# Patient Record
(demographics unavailable — no encounter records)

---

## 2024-10-16 NOTE — ASSESSMENT
[FreeTextEntry1] : Flu/COVID vax given. Due for c-scopy., Sertraline refilled. L ear sympotms like due to ETD and will start nasal rinses.  NSAIDs/PT for shoulder pain.

## 2024-10-16 NOTE — REVIEW OF SYSTEMS
[Negative] : Heme/Lymph [Earache] : earache [Hearing Loss] : no hearing loss [Nosebleed] : no nosebleeds [Nasal Discharge] : no nasal discharge [Sore Throat] : no sore throat [Postnasal Drip] : no postnasal drip [Joint Pain] : joint pain [Joint Stiffness] : joint stiffness [Joint Swelling] : no joint swelling

## 2024-10-16 NOTE — HISTORY OF PRESENT ILLNESS
[de-identified] : Here for f/u. Recall MAMMO ok 9/24. Remains Due for c-scopy., On higher dose of Sertraline since winter. Prefers this dose. Lots of family stress. Saw cardiologist. Doing well on Wegovy. Needs flu and COVID vaccines. L ear pressure with bending neck. No change in hearing.   R shoulder pain with external rotation. Tender along biceps insertion. No trauma. +clicking

## 2024-10-16 NOTE — PHYSICAL EXAM
[Normal Oropharynx] : the oropharynx was normal [Normal TMs] : both tympanic membranes were normal [Normal Nasal Mucosa] : the nasal mucosa was normal [No Edema] : there was no peripheral edema [Normal] : affect was normal and insight and judgment were intact

## 2025-01-15 NOTE — HISTORY OF PRESENT ILLNESS
[FreeTextEntry1] : Ms. HALL is a 59 year old female with pmhx of obesity who presents for follow-up.  Patient of Dr Mcgregor, last (initial) visit, 7/3/23 Last visit with myself, 07/11/2024  Interval change: Since last visit, has continued wegovy, titrating to current dosing of 1.7mg/weekly with tolerance.  Starting weight 194lb with current weight today of 156 for total of 38lb weight loss since wegovy initiation = TBW loss of 19.5% Weighs self on Wednesday and injects on this day, some weight gain over holidays, starting to come down, has plateaued.   Weight goal of 150   1. Obesity The patient's current BMI is: 24.43 from  26.78 from 29.76 from 30.38 (7/2023) The patient has been overweight or obese since childhood Complications related to obesity: HLD, osteoarthritis   The patient has tried the following in attempts to lose weight:  -- comprehensive weight loss programs, such as WW, Weight loss center at [facility] -- meal delivery program (similar to nutrisystem) -- diet modification (Atkins diet)  Medical management history includes:  -- Metformin (off-label) in the past -- wegovy 1.7mg/weekly (CURRENT) with Starting weight 194lb with current weight today of 156 for total of 38lb weight loss since wegovy initiation = TBW loss of 19.5%  No past surgical management.  Current diet:  -- 3 meals daily, +snack mid-morning, less of snack on wegovy  Current exercise:  -- walking, some decreased r/t bulge disc in back, manifesting as hip pain.  Improving pain and increasing walking again.

## 2025-01-15 NOTE — ASSESSMENT
[FreeTextEntry1] : 1. Obesity Obesity, class I. BMI trend since initiating Wegovy: BMI is: 26.78 from 29.76 from 30.38 (7/2023) Weight related comorbidities of HLD and osteoarthritis Starting weight 194lb with current weight today of 156 for total of 38lb weight loss since wegovy initiation = TBW loss of 19.5%  Kaila continues to endorse tolerance to wegovy regimen and is pleased with weight loss response it has yielded. She has weight loss plateau and is near her ideal weight and desires trial of dose titration to 2.4mg/weekly -- continue lifestyle modificaitons -- increase wegovy to 2.4mg/weekly  2. HLD On atorvastatin 10mg Most recent lipid panel  Follow-up in 4-6 months with myself or Dr. Balbir Harrell, MS, [facility], [facility] 01/15/2025

## 2025-01-15 NOTE — HISTORY OF PRESENT ILLNESS
[FreeTextEntry1] : Ms. HALL is a 59 year old female with pmhx of obesity who presents for follow-up.  Patient of Dr Mcgregor, last (initial) visit, 7/3/23 Last visit with myself, 07/11/2024  Interval change: Since last visit, has continued wegovy, titrating to current dosing of 1.7mg/weekly with tolerance.  Starting weight 194lb with current weight today of 156 for total of 38lb weight loss since wegovy initiation = TBW loss of 19.5% Weighs self on Wednesday and injects on this day, some weight gain over holidays, starting to come down, has plateaued.   Weight goal of 150   1. Obesity The patient's current BMI is: 24.43 from  26.78 from 29.76 from 30.38 (7/2023) The patient has been overweight or obese since childhood Complications related to obesity: HLD, osteoarthritis   The patient has tried the following in attempts to lose weight:  -- comprehensive weight loss programs, such as WW, Weight loss center at [facility] -- meal delivery program (similar to nutrisystem) -- diet modification (Atkins diet)  Medical management history includes:  -- Metformin (off-label) in the past -- wegovy 1.7mg/weekly (CURRENT) with Starting weight 194lb with current weight today of 156 for total of 38lb weight loss since wegovy initiation = TBW loss of 19.5%  No past surgical management.  Current diet:  -- 3 meals daily, +snack mid-morning, less of snack on wegovy  Current exercise:  -- walking, some decreased r/t bulge disc in back, manifesting as hip pain.  Improving pain and increasing walking again.

## 2025-01-15 NOTE — ASSESSMENT
[FreeTextEntry1] : 1. Obesity Obesity, class I. BMI trend since initiating Wegovy: BMI is: 26.78 from 29.76 from 30.38 (7/2023) Weight related comorbidities of HLD and osteoarthritis Starting weight 194lb with current weight today of 156 for total of 38lb weight loss since wegovy initiation = TBW loss of 19.5%  Kaila continues to endorse tolerance to wegovy regimen and is pleased with weight loss response it has yielded. She has weight loss plateau and is near her ideal weight and desires trial of dose titration to 2.4mg/weekly -- continue lifestyle modificaitons -- increase wegovy to 2.4mg/weekly  2. HLD On atorvastatin 10mg Most recent lipid panel  Follow-up in 4-6 months with myself or Dr. Balbir Harrell, MS, [facility], [facility] 01/15/2025

## 2025-01-15 NOTE — REVIEW OF SYSTEMS
[As Noted in HPI] : as noted in HPI [Recent Weight Loss (___ Lbs)] : recent weight loss: [unfilled] lbs [Joint Pain] : joint pain [Negative] : Respiratory [Fatigue] : no fatigue [Nausea] : no nausea [Constipation] : no constipation [Vomiting] : no vomiting [Diarrhea] : no diarrhea [Polydipsia] : no polydipsia

## 2025-04-16 NOTE — REVIEW OF SYSTEMS
[As Noted in HPI] : as noted in HPI [Negative] : Endocrine [Nausea] : no nausea [Constipation] : no constipation [Abdominal Pain] : no abdominal pain [Vomiting] : no vomiting [Diarrhea] : no diarrhea [Polyuria] : no polyuria

## 2025-04-16 NOTE — ASSESSMENT
[FreeTextEntry1] : 1. Obesity Obesity, class I Current regimen: wegovy 2.4mg/weekly BMI trend since initiating Wegovy: BMI is: 23.02 from 26.78 from 29.76 from 30.38 (7/2023) Weight related comorbidities of HLD and osteoarthritis Starting weight 194lb with current weight today of 147 for total of 47lb weight loss since wegovy initiation = TBW loss of 24.2%  Kaila continues to endorse tolerance to wegovy regimen and is pleased with weight loss response it has yielded.  -- continue lifestyle modificaitons -- continue wegovy 2.4mg/weekly  2. HLD On atorvastatin 10mg Most recent lipid panel from 6/2024 with LDL at goal  Follow-up in 6 months with myself or Dr. Balbir Harrell, MS, [facility], [facility] 04/16/2025

## 2025-04-16 NOTE — HISTORY OF PRESENT ILLNESS
[FreeTextEntry1] : Ms. HALL is a 59 year old female with pmhx of obesity, HLD, HTN  who presents for follow-up.  Patient of Dr Mcgregor, last (initial) visit, 7/3/23 Last visit with myself, 01/15/2025  Interval change: Since last visit, has continued wegovy, titrating to current dosing of 2.4mg/weekly with tolerance.  Starting weight 194lb with current weight today of 147 for total of 47lb weight loss since wegovy initiation = TBW loss of 24.2% Continued gradual weight loss, per patient Weight goal of 150lb, has achieved and maintained Has upcoming river cruise vacation to [place] Occasionally feeling colder easier, otherwise feeling well   1. Obesity The patient's current BMI is: 24.43 from 26.78 from 29.76 from 30.38 (7/2023) The patient has been overweight or obese since childhood Complications related to obesity: HLD, osteoarthritis   The patient has tried the following in attempts to lose weight:  -- comprehensive weight loss programs, such as WW, Weight loss center at [facility] -- meal delivery program (similar to nutrisystem) -- diet modification (Atkins diet)  Medical management history includes:  -- Metformin (off-label) in the past -- wegovy 1.7mg/weekly (CURRENT) with Starting weight 194lb with current weight today of 156 for total of 38lb weight loss since wegovy initiation = TBW loss of 19.5%  No past surgical management.  Current diet:  -- 3 meals daily, +snack mid-morning, less of snack on wegovy  Current exercise:  -- walking, some decreased r/t bulge disc in back, manifesting as hip pain.  Improving pain and increasing walking again.

## 2025-05-21 NOTE — ASSESSMENT
[FreeTextEntry1] : Pt with post-viral syndrome. If CXR ws clear and a course of Abx wasn't effective, another one is unlikely to be so. Will start low dose Prednisone and azelastitine nasal spray.  Obtain thyroid sono to eval multiple ndoules seen on remote scans.  Due for c-scopy. Due for CPE- as she plans to schedule, will defer lab work until then.  Metoprolol refilled. BP at goal.  Did well with GLP-1 agonist. Plan to space out injections for maintenance.   Back pain, chronic (724.5,338.29) (M54.9,G89.29)  -7 minutes were spent adminsitering an evidence based ASCVD Risk Assessment tool showing patient's risk being 3% (low) and discussing results with patient including lifestyle modificationms to be taken. zWeight loss to decrease risk. No indication for statin therapy at this time.  -Will start Zepbound 2.5 mg and f/u 8 weeks. Will have patient enroll in [facility]'s PATH program. -CHeck CMP.

## 2025-05-21 NOTE — REVIEW OF SYSTEMS
[Negative] : Heme/Lymph [Chills] : chills [Fatigue] : fatigue [Recent Change In Weight] : ~T recent weight change [Nasal Discharge] : nasal discharge [Postnasal Drip] : postnasal drip [Shortness Of Breath] : shortness of breath [Cough] : cough [Earache] : no earache [Wheezing] : no wheezing

## 2025-05-21 NOTE — HISTORY OF PRESENT ILLNESS
[de-identified] : Has had viral symptoms since 4/28. Began with vomitting/fever/cough on a cruise. On Day 4, went to urgent care and tested negative for COVID/FLU. Had CXR which was normal. Given Antibiotics. Still have symptoms- intermittently mucus-y cough, congestion, sinus pressure, fatigue and trouble sleeping. Mild dyspnea. Using saline nasal spray and staying hydrated.   Remains Due for c-scopy.,  Current dose of Sertraline which works well.  Doing well on Wegovy. At goal weight- plans to space doses further apart.  Had parathyroidectomy with Dr Davis years ago. Had three years of f/u scans and then told no more needed. However, these scans show multiple small benign appearing thyroid nodules. She has not had THOSE looked at since.   Plans to return soon for CPE. Needs refills.

## 2025-05-21 NOTE — PHYSICAL EXAM
[Normal Outer Ear/Nose] : the outer ears and nose were normal in appearance [Normal Oropharynx] : the oropharynx was normal [Normal TMs] : both tympanic membranes were normal [Normal Nasal Mucosa] : the nasal mucosa was normal [No Edema] : there was no peripheral edema [Normal] : affect was normal and insight and judgment were intact

## 2025-05-21 NOTE — HEALTH RISK ASSESSMENT
[No falls in past year] : Patient reported no falls in the past year [0] : 2) Feeling down, depressed, or hopeless: Not at all (0) [PHQ-2 Negative - No further assessment needed] : PHQ-2 Negative - No further assessment needed [Never] : Never [[id number]] : 0

## 2025-06-18 NOTE — ASSESSMENT
[Vaccines Reviewed] : Immunizations reviewed today. Please see immunization details in the vaccine log within the immunization flowsheet.  [FreeTextEntry1] : 59 year is here for a CPE. She was counselled on diet and exercise, drug and alcohol use, age appropriate health care maintenance including vaccines, seatbelts, sunscreen, stress reduction and safe sex. All questions asked/answered to the best of my ability.  Labs sent. Referred for c-scopy. Reminded to get thyroid sono. Will have her see ENT for further evaluation-exam is benign thus far.   7 minutes were spent adminsitering an evidence based ASCVD Risk Assessment tool showing patient's risk being 1.8% (low risk) and discussing results with patient including lifestyle modificationms to be taken..

## 2025-06-18 NOTE — PHYSICAL EXAM
[No Acute Distress] : no acute distress [Well Nourished] : well nourished [Well Developed] : well developed [Well-Appearing] : well-appearing [Normal Sclera/Conjunctiva] : normal sclera/conjunctiva [PERRL] : pupils equal round and reactive to light [EOMI] : extraocular movements intact [Normal Outer Ear/Nose] : the outer ears and nose were normal in appearance [Normal Oropharynx] : the oropharynx was normal [No JVD] : no jugular venous distention [No Lymphadenopathy] : no lymphadenopathy [Supple] : supple [Thyroid Normal, No Nodules] : the thyroid was normal and there were no nodules present [No Respiratory Distress] : no respiratory distress  [No Accessory Muscle Use] : no accessory muscle use [Clear to Auscultation] : lungs were clear to auscultation bilaterally [Normal Rate] : normal rate  [Regular Rhythm] : with a regular rhythm [Normal S1, S2] : normal S1 and S2 [No Murmur] : no murmur heard [No Carotid Bruits] : no carotid bruits [No Abdominal Bruit] : a ~M bruit was not heard ~T in the abdomen [No Varicosities] : no varicosities [Pedal Pulses Present] : the pedal pulses are present [No Edema] : there was no peripheral edema [No Palpable Aorta] : no palpable aorta [No Extremity Clubbing/Cyanosis] : no extremity clubbing/cyanosis [Soft] : abdomen soft [Non Tender] : non-tender [Non-distended] : non-distended [No Masses] : no abdominal mass palpated [No HSM] : no HSM [Normal Bowel Sounds] : normal bowel sounds [Normal Posterior Cervical Nodes] : no posterior cervical lymphadenopathy [Normal Anterior Cervical Nodes] : no anterior cervical lymphadenopathy [No CVA Tenderness] : no CVA  tenderness [No Spinal Tenderness] : no spinal tenderness [No Joint Swelling] : no joint swelling [Grossly Normal Strength/Tone] : grossly normal strength/tone [No Rash] : no rash [Coordination Grossly Intact] : coordination grossly intact [No Focal Deficits] : no focal deficits [Normal Gait] : normal gait [Deep Tendon Reflexes (DTR)] : deep tendon reflexes were 2+ and symmetric [Normal Affect] : the affect was normal [Normal Insight/Judgement] : insight and judgment were intact [Normal TMs] : both tympanic membranes were normal [Normal Nasal Mucosa] : the nasal mucosa was normal

## 2025-06-18 NOTE — HISTORY OF PRESENT ILLNESS
[de-identified] : 58 y/o female presents for CPE. Still feeling ear pressure. Whn she pulls the tragus, it relieves it. Uses nasal washes but it continues. Remains on GLP-1. Hasn't had c-scopy or thyroid sono yet.

## 2025-06-18 NOTE — HEALTH RISK ASSESSMENT
[Good] : ~his/her~  mood as  good [No falls in past year] : Patient reported no falls in the past year [0] : 2) Feeling down, depressed, or hopeless: Not at all (0) [PHQ-2 Negative - No further assessment needed] : PHQ-2 Negative - No further assessment needed [Yes] : takes [Never] : Never [NO] : No [Fully functional (bathing, dressing, toileting, transferring, walking, feeding)] : Fully functional (bathing, dressing, toileting, transferring, walking, feeding) [Fully functional (using the telephone, shopping, preparing meals, housekeeping, doing laundry, using] : Fully functional and needs no help or supervision to perform IADLs (using the telephone, shopping, preparing meals, housekeeping, doing laundry, using transportation, managing medications and managing finances) [Smoke Detector] : smoke detector [Safety elements used in home] : safety elements used in home [Seat Belt] :  uses seat belt [Sunscreen] : uses sunscreen [[id number]] : 0 [Reports changes in hearing] : Reports no changes in hearing [Reports changes in vision] : Reports no changes in vision [Reports changes in dental health] : Reports no changes in dental health

## 2025-06-18 NOTE — REVIEW OF SYSTEMS
[Negative] : Heme/Lymph [Earache] : earache [Hearing Loss] : hearing loss [Sore Throat] : no sore throat

## 2025-07-16 NOTE — PHYSICAL EXAM
[TextEntry] : General: AAO, no significant distress Psychiatric: affect pleasant and within normal limits Eyes: relatively symmetric, no obvious nystagmus Skin: no significant lesions on face Nose: no significant lesions; patent. Oral Cavity & Oropharynx: no significant deformity or lesions Neck/Lymphatics: no significant masses or abnormal cervical nodes palpated Respiratory: breathing comfortably; no significant distress Neurologic: cranial nerves II-XII grossly intact; EOMI Facial function: symmetric   Ear examination performed under binocular otologic microscope: Left Ear External: Pinna and periauricular area is normal. Canal: Ear canal skin is not inflamed or edematous. Left cerumen impaction cleaned under microscopy with instrumentation Tympanic Membrane: Intact and in good position.   Right Ear External: Pinna and periauricular area is normal. Canal: Ear canal skin is not inflamed or edematous.  Tympanic Membrane: Intact and in good position.   Procedure: Left cerumen removal Pre-operative Diagnosis: Left cerumen impaction Post-operative Diagnosis: Left cerumen impaction Anesthesia: None Procedure Details: The patient was placed in the supine position. The left ear canal was determined to be impacted with cerumen. A curette and/or suction was used to remove the cerumen impaction under microscopy. Condition: Stable. Patient tolerated procedure well. Complications: None.   Nasal Endoscopy:   Pre-operative Diagnosis: post-nasal drip Post-operative Diagnosis: post-nasal drip, bilateral inferior turbinate hypertrophy Anesthesia: Topical or None Procedure: Bilateral nasal endoscopy Procedure Details:   The patient was placed in the seated position sitting straight up. The telescope was passed along the left nasal floor to the nasopharynx. It was then passed into the region of the middle meatus, middle turbinate, and the sphenoethmoid region. An identical procedure was performed on the right side.   Findings: Interior nasal cavity: normal bilaterally Middle and superior meatus: normal bilaterally Sphenoethmoidal recess: normal bilaterally Mucosa: normal bilaterally Nasal septum: normal Discharge: none bilaterally Turbinates: bilateral inferior turbinate hypertrophy Adenoid: normal bilaterally Posterior choanae: normal bilaterally Eustachian tubes: normal bilaterally Mucous stranding: normal bilaterally Lesions: Not present   Comments: secretions in choana   Condition: Stable. Patient tolerated procedure well.

## 2025-07-16 NOTE — ASSESSMENT
[FreeTextEntry1] : Eustachian Tube Dysfunction - RIGHT - 1 chronic illness - discussed treatment options for eustachian tube dysfunction including observation, steroid course, ear tube, surgery - patient would like to pursue ipratropium bromide spray - audiogram personally interpreted and reviewed with patient - normal hearing bilaterally  Post-nasal Drip / Bilateral inferior turbinate hypertrophy - 2 chronic illnesses - Ipratropium Bromide 1 spray to each nostril bid - discussed the risks of Ipratropium bromide spray which include but are not limited to: epistaxis, dry nose, pharyngitis, nasal irritation/pain, headache, sore throat, congestion, sinusitis, rhinorrhea  Left Cerumen Impaction - 1 chronic illness - Left cerumen impaction removed under microscopy with instrumentation  - f/u as needed

## 2025-07-16 NOTE — PHYSICAL EXAM
[Normal Conjunctiva] : normal conjunctiva [No Carotid Bruit] : no carotid bruit [Normal S1, S2] : normal S1, S2 [Normal Gait] : normal gait [Normal] : alert and oriented, normal memory [de-identified] : 1/6 systolic ejection murmur

## 2025-07-16 NOTE — DATA REVIEWED
[de-identified] : Audiogram personally reviewed and interpreted and my findings are as follows (7/16/25): Right: SRT 10dB; %; Type A tymp; normal Left: SRT 10dB; %; Type A tymp; normal

## 2025-07-16 NOTE — HISTORY OF PRESENT ILLNESS
[de-identified] : 60F who presents with chronic left ear pressure problems for the past 1+ year. She states it started last summer when she was swimming a lot. She endorses that it occurs when she flexes her neck forward and is looking down. No otalgia, otorrhea, vertigo, tinnitus, acute hearing changes. No hx of ear surgery nor ear infections. She has chronic post-nasal drip causing a lot of mucous and irritation in her throat. She has a hx of parathyroid surgery.

## 2025-07-16 NOTE — HISTORY OF PRESENT ILLNESS
[FreeTextEntry1] : Since her last visit, pt is having shoulder issues snap  Activity:swimming  Pt. denies any chest pain, dyspnea, orthopnea, PND, palpitations, lightheadedness, syncope or lower extremity edema.   =================================== Labs/Diagnostics:  2024 A1c: 5.4% Lipid profile: T, TC: 236, HDL: 63, LDL: 162 K/Creat: 4.7/0.68 TSH: 1.76  Stress echo (2023):  CONCLUSIONS: 1. Treadmill exercise Stress Echo Test. 2. Normal stress echocardiogram with normal augmentation of left ventricular systolic function. 3. No evidence of induced ischemia.  NM stress 2022: Myocardial perfusion imaging is normal. Overall LVF is hyperdynamic without regional wall motion abnormalities, however there is evidence of ischemic dilation of the left ventricle. The EKG stress test is positive. The duke treadmill score is 3, predictive of an intermediate 5-year risk for a cardiovascular event.    Cardiac Cath 2022: Non-obstructive CAD and mid - LAD myocardial bridge  Neuro work-up 2021: EMG normal   MR angiogram brain normal  MRI : nonspecific changes in the white matter  pt has not had further neurologic symptoms missed appt with Dr Garza

## 2025-07-16 NOTE — REASON FOR VISIT
[Structural Heart and Valve Disease] : structural heart and valve disease [FreeTextEntry1] : Pt. is a 58-year-old F with PMHx of HLD and mid-LAD myocardial bridge who presents today for follow-up.

## 2025-07-16 NOTE — ASSESSMENT
[FreeTextEntry1] : Pt. is a 58-year-old F with PMHx of HLD and mid-LAD myocardial bridge who presents today for follow-up.    Myocardial bridge mild cad continue atorvastatin reenforce low cholestrol diet ldl has increased from 60s to 90s Neuro w/u normal mri of brain